# Patient Record
Sex: MALE | Race: WHITE | NOT HISPANIC OR LATINO | URBAN - METROPOLITAN AREA
[De-identification: names, ages, dates, MRNs, and addresses within clinical notes are randomized per-mention and may not be internally consistent; named-entity substitution may affect disease eponyms.]

---

## 2019-02-24 ENCOUNTER — HOSPITAL ENCOUNTER (EMERGENCY)
Facility: HOSPITAL | Age: 84
Discharge: HOME | End: 2019-02-24
Attending: EMERGENCY MEDICINE
Payer: MEDICARE

## 2019-02-24 VITALS
HEIGHT: 72 IN | SYSTOLIC BLOOD PRESSURE: 131 MMHG | WEIGHT: 213.38 LBS | DIASTOLIC BLOOD PRESSURE: 65 MMHG | RESPIRATION RATE: 19 BRPM | BODY MASS INDEX: 28.9 KG/M2 | OXYGEN SATURATION: 98 % | TEMPERATURE: 99 F | HEART RATE: 69 BPM

## 2019-02-24 DIAGNOSIS — M02.30 REACTIVE ARTHRITIS: Primary | ICD-10-CM

## 2019-02-24 DIAGNOSIS — M79.642 LEFT HAND PAIN: ICD-10-CM

## 2019-02-24 LAB
ALBUMIN SERPL BCP-MCNC: 3.1 G/DL
ALP SERPL-CCNC: 73 U/L
ALT SERPL W/O P-5'-P-CCNC: 15 U/L
ANION GAP SERPL CALC-SCNC: 10 MMOL/L
AST SERPL-CCNC: 14 U/L
BASOPHILS # BLD AUTO: 0.01 K/UL
BASOPHILS NFR BLD: 0.2 %
BILIRUB SERPL-MCNC: 0.6 MG/DL
BUN SERPL-MCNC: 24 MG/DL
CALCIUM SERPL-MCNC: 8.8 MG/DL
CHLORIDE SERPL-SCNC: 106 MMOL/L
CO2 SERPL-SCNC: 25 MMOL/L
CREAT SERPL-MCNC: 1.1 MG/DL
DIFFERENTIAL METHOD: ABNORMAL
EOSINOPHIL # BLD AUTO: 0.1 K/UL
EOSINOPHIL NFR BLD: 1 %
ERYTHROCYTE [DISTWIDTH] IN BLOOD BY AUTOMATED COUNT: 13.2 %
EST. GFR  (AFRICAN AMERICAN): >60 ML/MIN/1.73 M^2
EST. GFR  (NON AFRICAN AMERICAN): 59.2 ML/MIN/1.73 M^2
GLUCOSE SERPL-MCNC: 116 MG/DL
HCT VFR BLD AUTO: 41.6 %
HGB BLD-MCNC: 13.6 G/DL
LYMPHOCYTES # BLD AUTO: 1.1 K/UL
LYMPHOCYTES NFR BLD: 23.3 %
MCH RBC QN AUTO: 29.8 PG
MCHC RBC AUTO-ENTMCNC: 32.7 G/DL
MCV RBC AUTO: 91 FL
MONOCYTES # BLD AUTO: 0.6 K/UL
MONOCYTES NFR BLD: 11.9 %
NEUTROPHILS # BLD AUTO: 3 K/UL
NEUTROPHILS NFR BLD: 63.4 %
PLATELET # BLD AUTO: 144 K/UL
PMV BLD AUTO: 9.1 FL
POTASSIUM SERPL-SCNC: 4.2 MMOL/L
PROT SERPL-MCNC: 6.8 G/DL
RBC # BLD AUTO: 4.57 M/UL
SODIUM SERPL-SCNC: 141 MMOL/L
URATE SERPL-MCNC: 5.2 MG/DL
WBC # BLD AUTO: 4.77 K/UL

## 2019-02-24 PROCEDURE — 99283 EMERGENCY DEPT VISIT LOW MDM: CPT | Mod: ER

## 2019-02-24 PROCEDURE — 84550 ASSAY OF BLOOD/URIC ACID: CPT | Mod: ER

## 2019-02-24 PROCEDURE — 80053 COMPREHEN METABOLIC PANEL: CPT | Mod: ER

## 2019-02-24 PROCEDURE — 85025 COMPLETE CBC W/AUTO DIFF WBC: CPT | Mod: ER

## 2019-02-24 RX ORDER — COLCHICINE 0.6 MG/1
TABLET ORAL
Qty: 3 TABLET | Refills: 0 | Status: SHIPPED | OUTPATIENT
Start: 2019-02-24

## 2019-02-24 RX ORDER — METOPROLOL TARTRATE 25 MG/1
25 TABLET, FILM COATED ORAL DAILY
COMMUNITY

## 2019-02-24 RX ORDER — DULOXETIN HYDROCHLORIDE 60 MG/1
60 CAPSULE, DELAYED RELEASE ORAL EVERY MORNING
COMMUNITY

## 2019-02-24 RX ORDER — PANTOPRAZOLE SODIUM 40 MG/1
40 TABLET, DELAYED RELEASE ORAL DAILY
COMMUNITY

## 2019-02-24 RX ORDER — ASPIRIN 81 MG/1
81 TABLET ORAL DAILY
COMMUNITY

## 2019-02-24 RX ORDER — GABAPENTIN 600 MG/1
600 TABLET ORAL 3 TIMES DAILY
COMMUNITY

## 2019-02-24 RX ORDER — DUTASTERIDE 0.5 MG/1
0.5 CAPSULE, LIQUID FILLED ORAL DAILY
COMMUNITY

## 2019-02-24 NOTE — DISCHARGE INSTRUCTIONS
The nature of gout is fully explained, including dietary relationship, acute and interval phase and treatment of both. Long term complications such as kidney stones, tophi and arthritis are discussed. Avoidance of alcohol recommended, and written literature is given along with a low purine diet. Indications for the use of allopurinol for prophylaxis and the use of colchicine to prevent or treat flare-ups is also discussed. Proper use of indomethacin for acute attacks discussed, and its side effects. Call if further attacks occur, or this one does not resolve promptly.

## 2019-02-24 NOTE — ED PROVIDER NOTES
Encounter Date: 2/24/2019       History     Chief Complaint   Patient presents with    Arm Pain     L arm pain since yesterday evening. Swelling noted to wrist and hand. Reports fall several weeks ago.      The history is provided by the patient.   Hand Injury    The incident occurred yesterday (noticed pain and edema of left hand and wrist). The incident occurred at home. There was no injury mechanism. The pain is present in the left hand and left wrist. The quality of the pain is described as aching. Pain scale: mild. The pain has been constant since the incident. Pertinent negatives include no fever and no malaise/fatigue. He reports no foreign bodies present. The symptoms are aggravated by movement, use and palpation. He has tried NSAIDs and rest for the symptoms. The treatment provided moderate relief.     Review of patient's allergies indicates:  No Known Allergies  Past Medical History:   Diagnosis Date    Cancer     L leg    Hypertension      Past Surgical History:   Procedure Laterality Date    L knee, L leg with hardware Left      History reviewed. No pertinent family history.  Social History     Tobacco Use    Smoking status: Never Smoker    Smokeless tobacco: Never Used   Substance Use Topics    Alcohol use: No     Frequency: Never     Comment: seldom    Drug use: No     Review of Systems   Constitutional: Negative for fever and malaise/fatigue.   HENT: Negative for sore throat.    Respiratory: Negative for shortness of breath.    Cardiovascular: Negative for chest pain.   Gastrointestinal: Negative for nausea.   Genitourinary: Negative for dysuria.   Musculoskeletal: Negative for back pain.   Skin: Negative for rash.   Neurological: Negative for weakness.   Hematological: Does not bruise/bleed easily.   All other systems reviewed and are negative.      Physical Exam     Initial Vitals [02/24/19 0940]   BP Pulse Resp Temp SpO2   138/66 75 18 98.6 °F (37 °C) 95 %      MAP       --         Physical  Exam    Nursing note and vitals reviewed.  Constitutional: He appears well-developed and well-nourished. He is not diaphoretic. No distress.   HENT:   Head: Normocephalic and atraumatic.   Eyes: EOM are normal. Pupils are equal, round, and reactive to light. No scleral icterus.   Neck: Normal range of motion. Neck supple. No thyromegaly present.   Cardiovascular: Normal rate, regular rhythm, normal heart sounds and intact distal pulses. Exam reveals no gallop and no friction rub.    No murmur heard.  Pulmonary/Chest: Breath sounds normal. No respiratory distress. He has no wheezes. He has no rhonchi. He exhibits no tenderness.   Abdominal: Soft. Bowel sounds are normal. He exhibits no distension. There is no tenderness. There is no rebound and no guarding.   Musculoskeletal: He exhibits no edema.        Right shoulder: Normal.        Left shoulder: Normal.        Right elbow: Normal.       Left elbow: Normal.        Right wrist: Normal.        Left wrist: He exhibits decreased range of motion (secondary to pain), bony tenderness and swelling. He exhibits no effusion, no crepitus, no deformity and no laceration.        Right ankle: Normal. He exhibits normal pulse. Achilles tendon normal.        Left ankle: Normal. He exhibits normal pulse. Achilles tendon normal.        Cervical back: Normal.        Thoracic back: Normal.        Lumbar back: Normal.        Right upper arm: Normal.        Left upper arm: Normal.        Right forearm: Normal.        Left forearm: Normal.        Right hand: Normal. He exhibits normal capillary refill. Normal sensation noted. Normal strength noted.        Left hand: He exhibits decreased range of motion (secondary to pain) and swelling. He exhibits normal capillary refill. Normal sensation noted. Decreased strength (secondary to pain) noted.   No recent injury.  Neurovascularly intact distal to pain.  Tendons intact.  2+ radial pulses, equal bilaterally.   Lymphadenopathy:     He has no  cervical adenopathy.   Neurological: He is alert and oriented to person, place, and time. He has normal strength. No cranial nerve deficit or sensory deficit. GCS eye subscore is 4. GCS verbal subscore is 5. GCS motor subscore is 6.   Skin: Skin is warm and dry.   Psychiatric: He has a normal mood and affect. His behavior is normal. Judgment and thought content normal.         ED Course   Procedures  Labs Reviewed   CBC W/ AUTO DIFFERENTIAL - Abnormal; Notable for the following components:       Result Value    RBC 4.57 (*)     Hemoglobin 13.6 (*)     Platelets 144 (*)     MPV 9.1 (*)     All other components within normal limits   COMPREHENSIVE METABOLIC PANEL - Abnormal; Notable for the following components:    Glucose 116 (*)     BUN, Bld 24 (*)     Albumin 3.1 (*)     eGFR if non  59.2 (*)     All other components within normal limits   URIC ACID          Imaging Results          X-Ray Hand 3 view Left (Final result)  Result time 02/24/19 10:22:03    Final result by Virgil Sharp MD (02/24/19 10:22:03)                 Impression:      Degenerative changes.      Electronically signed by: Virgil Sharp MD  Date:    02/24/2019  Time:    10:22             Narrative:    EXAMINATION:  XR HAND COMPLETE 3 VIEW LEFT    CLINICAL HISTORY:  left hand pain;    TECHNIQUE:  PA, lateral, and oblique views of the right hand were performed.    COMPARISON:  None    FINDINGS:  Joint space narrowing of DIP and PIP joints consistent with degenerative changes.  No osteolytic abnormality.  No acute injury/fracture.                               X-Ray Wrist Complete Left (Final result)  Result time 02/24/19 10:23:06    Final result by Virgil Sharp MD (02/24/19 10:23:06)                 Impression:      Mild degenerative changes.  Soft tissue calcifications adjacent to the risk could represent degenerative changes/tendinitis or possibly gout.      Electronically signed by: Virgil Sharp  MD  Date:    02/24/2019  Time:    10:23             Narrative:    EXAMINATION:  XR WRIST COMPLETE 3 VIEWS LEFT    CLINICAL HISTORY:  Pain in left hand    TECHNIQUE:  PA, lateral, and oblique views of the left wrist were performed.    COMPARISON:  None    FINDINGS:  Soft tissue calcifications can be seen on the palmar surface of the wrist superficial to the lunate.  Possible calcific tendinitis.  Differential considerations could include gout.                                       Vitals:    02/24/19 0940 02/24/19 1056 02/24/19 1143   BP: 138/66 132/68 131/65   Pulse: 75 71 69   Resp: 18 19 19   Temp: 98.6 °F (37 °C)     TempSrc: Oral     SpO2: 95% 98% 98%   Weight: 96.8 kg (213 lb 6.5 oz)     Height: 6' (1.829 m)         Results for orders placed or performed during the hospital encounter of 02/24/19   CBC auto differential   Result Value Ref Range    WBC 4.77 3.90 - 12.70 K/uL    RBC 4.57 (L) 4.60 - 6.20 M/uL    Hemoglobin 13.6 (L) 14.0 - 18.0 g/dL    Hematocrit 41.6 40.0 - 54.0 %    MCV 91 82 - 98 fL    MCH 29.8 27.0 - 31.0 pg    MCHC 32.7 32.0 - 36.0 g/dL    RDW 13.2 11.5 - 14.5 %    Platelets 144 (L) 150 - 350 K/uL    MPV 9.1 (L) 9.2 - 12.9 fL    Gran # (ANC) 3.0 1.8 - 7.7 K/uL    Lymph # 1.1 1.0 - 4.8 K/uL    Mono # 0.6 0.3 - 1.0 K/uL    Eos # 0.1 0.0 - 0.5 K/uL    Baso # 0.01 0.00 - 0.20 K/uL    Gran% 63.4 38.0 - 73.0 %    Lymph% 23.3 18.0 - 48.0 %    Mono% 11.9 4.0 - 15.0 %    Eosinophil% 1.0 0.0 - 8.0 %    Basophil% 0.2 0.0 - 1.9 %    Differential Method Automated    Comprehensive metabolic panel   Result Value Ref Range    Sodium 141 136 - 145 mmol/L    Potassium 4.2 3.5 - 5.1 mmol/L    Chloride 106 95 - 110 mmol/L    CO2 25 23 - 29 mmol/L    Glucose 116 (H) 70 - 110 mg/dL    BUN, Bld 24 (H) 8 - 23 mg/dL    Creatinine 1.1 0.5 - 1.4 mg/dL    Calcium 8.8 8.7 - 10.5 mg/dL    Total Protein 6.8 6.0 - 8.4 g/dL    Albumin 3.1 (L) 3.5 - 5.2 g/dL    Total Bilirubin 0.6 0.1 - 1.0 mg/dL    Alkaline Phosphatase 73 55 -  135 U/L    AST 14 10 - 40 U/L    ALT 15 10 - 44 U/L    Anion Gap 10 8 - 16 mmol/L    eGFR if African American >60.0 >60 mL/min/1.73 m^2    eGFR if non  59.2 (A) >60 mL/min/1.73 m^2   Uric acid   Result Value Ref Range    Uric Acid 5.2 3.4 - 7.0 mg/dL         Imaging Results          X-Ray Hand 3 view Left (Final result)  Result time 02/24/19 10:22:03    Final result by Virgil Sharp MD (02/24/19 10:22:03)                 Impression:      Degenerative changes.      Electronically signed by: Virgil Sharp MD  Date:    02/24/2019  Time:    10:22             Narrative:    EXAMINATION:  XR HAND COMPLETE 3 VIEW LEFT    CLINICAL HISTORY:  left hand pain;    TECHNIQUE:  PA, lateral, and oblique views of the right hand were performed.    COMPARISON:  None    FINDINGS:  Joint space narrowing of DIP and PIP joints consistent with degenerative changes.  No osteolytic abnormality.  No acute injury/fracture.                               X-Ray Wrist Complete Left (Final result)  Result time 02/24/19 10:23:06    Final result by Virgil Sharp MD (02/24/19 10:23:06)                 Impression:      Mild degenerative changes.  Soft tissue calcifications adjacent to the risk could represent degenerative changes/tendinitis or possibly gout.      Electronically signed by: Virgil Sharp MD  Date:    02/24/2019  Time:    10:23             Narrative:    EXAMINATION:  XR WRIST COMPLETE 3 VIEWS LEFT    CLINICAL HISTORY:  Pain in left hand    TECHNIQUE:  PA, lateral, and oblique views of the left wrist were performed.    COMPARISON:  None    FINDINGS:  Soft tissue calcifications can be seen on the palmar surface of the wrist superficial to the lunate.  Possible calcific tendinitis.  Differential considerations could include gout.                                Medications - No data to display    12:00 PM - Re-evaluation: The patient is resting comfortably and is in no acute distress. He states that his symptoms have improved  after treatment within ER. Discussed test results, shared treatment plan, specific conditions for return, and importance of follow up with patient and family.  He understands and agrees with the plan as discussed. Answered  his questions at this time. He has remained hemodynamically stable throughout the ED course and is appropriate for discharge home.     The nature of gout is fully explained, including dietary relationship, acute and interval phase and treatment of both. Long term complications such as kidney stones, tophi and arthritis are discussed. Avoidance of alcohol recommended, and written literature is given along with a low purine diet. Indications for the use of allopurinol for prophylaxis and the use of colchicine to prevent or treat flare-ups is also discussed. Proper use of indomethacin for acute attacks discussed, and its side effects. Call if further attacks occur, or this one does not resolve promptly.    Educated patient on diet and lifestyle changes that may help prevent gouty attacks. Recommendations include: avoid alcohol; reduce purine-rich foods - especially anchovies, sardines, oils, herring, organ meat, legumes (dried beans and peas), gravies, mushrooms, spinach, asparagus, cauliflower, consommé, and baking or hobson's yeast; limit amount of food eaten at each meal; avoid fatty foods such as salad dressings, ice cream, and fried foods; and to lose weight slowly if dieting as quick weight loss may cause uric acid kidney stones to form. The patient was advised to take medications for treatment as prescribed and to take NSAIDs for pain relief as soon as symptoms begin. Encouraged patient to follow up with primary care provider for re-evaluation in 2-3 days.    Pre-hypertension/Hypertension: The pt has been informed that they may have pre-hypertension or hypertension based on a blood pressure reading in the ED. I recommend that the pt call the PCP listed on their discharge instructions or a  physician of their choice this week to arrange f/u for further evaluation of possible pre-hypertension or hypertension.     A José Miguel Jacobsen was given a handout which discussed their disease process, precautions, and instructions for follow-up and therapy.    Follow-up Information     Indra Villagran Iii, MD. Schedule an appointment as soon as possible for a visit in 1 day.    Specialty:  Internal Medicine  Contact information:  [address]  [address]  [facility] [address] [phone]             [facility].    Specialty:  Emergency Medicine  Why:  As needed, If symptoms worsen  Contact information:  [address] [address] [address] [phone]                    Medication List      START taking these medications    colchicine 0.6 mg tablet  Commonly known as:  COLCRYS  Take 1.2 mg po x 1, then 0.6mg po 1 hour later x 1        ASK your doctor about these medications    aspirin 81 MG EC tablet  Commonly known as:  ECOTRIN     DULoxetine 60 MG capsule  Commonly known as:  CYMBALTA     dutasteride 0.5 mg capsule  Commonly known as:  AVODART     gabapentin 600 MG tablet  Commonly known as:  NEURONTIN     metoprolol tartrate 25 MG tablet  Commonly known as:  LOPRESSOR     MYRBETRIQ 50 mg Tb24  Generic drug:  mirabegron     pantoprazole 40 MG tablet  Commonly known as:  PROTONIX           Where to Get Your Medications      You can get these medications from any pharmacy    Bring a paper prescription for each of these medications  · colchicine 0.6 mg tablet        Discharge Medication List as of 2/24/2019 11:45 AM      START taking these medications    Details   colchicine (COLCRYS) 0.6 mg tablet Take 1.2 mg po x 1, then 0.6mg po 1 hour later x 1, Print               ED Diagnosis  1. Reactive arthritis    2. Left hand pain                          Clinical Impression:       ICD-10-CM ICD-9-CM   1. Reactive arthritis M02.30 099.3     711.10   2. Left hand pain M79.642 729.5          Disposition:   Disposition: Discharged  Condition: Stable                        Indra Galloway Jr., MD  02/24/19 7742